# Patient Record
Sex: FEMALE | Race: WHITE | NOT HISPANIC OR LATINO | Employment: PART TIME | URBAN - METROPOLITAN AREA
[De-identification: names, ages, dates, MRNs, and addresses within clinical notes are randomized per-mention and may not be internally consistent; named-entity substitution may affect disease eponyms.]

---
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
## 2019-01-10 ENCOUNTER — OFFICE VISIT (OUTPATIENT)
Dept: INTERNAL MEDICINE CLINIC | Facility: CLINIC | Age: 19
End: 2019-01-10
Payer: COMMERCIAL

## 2019-01-10 VITALS
SYSTOLIC BLOOD PRESSURE: 102 MMHG | WEIGHT: 187.6 LBS | OXYGEN SATURATION: 97 % | BODY MASS INDEX: 34.31 KG/M2 | DIASTOLIC BLOOD PRESSURE: 78 MMHG | HEART RATE: 83 BPM | TEMPERATURE: 98.5 F

## 2019-01-10 DIAGNOSIS — H66.91 OTITIS OF RIGHT EAR: Primary | ICD-10-CM

## 2019-01-10 PROCEDURE — 99213 OFFICE O/P EST LOW 20 MIN: CPT | Performed by: NURSE PRACTITIONER

## 2019-01-10 RX ORDER — AMOXICILLIN AND CLAVULANATE POTASSIUM 875; 125 MG/1; MG/1
1 TABLET, FILM COATED ORAL EVERY 12 HOURS SCHEDULED
Qty: 7 TABLET | Refills: 0 | Status: SHIPPED | OUTPATIENT
Start: 2019-01-10 | End: 2019-01-17

## 2019-01-10 NOTE — LETTER
January 10, 2019     Patient: Song Muñoz   YOB: 2000   Date of Visit: 1/10/2019       To Whom it May Concern:    Song Muñoz is under my professional care  She was seen in my office on 1/10/2019  She may return to work on 1/11/19  If you have any questions or concerns, please don't hesitate to call           Sincerely,          KRISTEN Mackenzie        CC: No Recipients

## 2019-01-10 NOTE — PROGRESS NOTES
Assessment/Plan:    Otitis  Start on amox x 10 days by patient first  Minimal improvement  Continues with erythema to R ear  Start augmentin x 7 days  Additionally recommend flonase as mild fluid L ear    Pt aware needs labs and will schedule routine follow-up     Diagnoses and all orders for this visit:    Otitis of right ear  -     amoxicillin-clavulanate (AUGMENTIN) 875-125 mg per tablet; Take 1 tablet by mouth every 12 (twelve) hours for 7 days        Subjective:      Patient ID: Song Muñoz is a 22 y o  female  Earache    There is pain in both (primarily R ear) ears  This is a new problem  Episode onset: 2 weeks  The problem occurs constantly  The problem has been waxing and waning  There has been no fever  Associated symptoms include headaches  Pertinent negatives include no abdominal pain, coughing, diarrhea, ear discharge, hearing loss, rhinorrhea, sore throat or vomiting  Associated symptoms comments: + tinnitus   Treatments tried: amoxicillin  The treatment provided mild relief  There is no history of a chronic ear infection or hearing loss  The following portions of the patient's history were reviewed and updated as appropriate: allergies, current medications, past family history, past medical history, past social history, past surgical history and problem list     Review of Systems   Constitutional: Negative for chills, fatigue and fever  HENT: Positive for ear pain  Negative for ear discharge, hearing loss, postnasal drip, rhinorrhea, sinus pain, sinus pressure and sore throat  Respiratory: Negative for cough, shortness of breath and wheezing  Cardiovascular: Negative for chest pain and palpitations  Gastrointestinal: Positive for constipation  Negative for abdominal pain, diarrhea, nausea and vomiting  Genitourinary: Negative for dysuria, frequency and hematuria  Neurological: Positive for headaches  Negative for dizziness and light-headedness           Past Medical History: Diagnosis Date    ADHD     Anxiety     Asthma     exercise induced    Depression     EVELYNE (generalized anxiety disorder)     IBS (irritable bowel syndrome)     Migraine headache     Obesity     bmi 33 0-33 9         Current Outpatient Prescriptions:     Adapalene-Benzoyl Peroxide (EPIDUO) 0 1-2 5 % gel, Apply 1 application topically daily Apply to affected area externally, Disp: , Rfl:     albuterol (VENTOLIN HFA) 90 mcg/act inhaler, Inhale 2 puffs every 6 (six) hours as needed for wheezing (20-30 min before exercise as needed), Disp: , Rfl:     atoMOXetine (STRATTERA) 18 mg capsule, Take 18 mg by mouth daily, Disp: , Rfl:     Butalbital-APAP-Caffeine (FIORICET) -40 MG CAPS, Take 1 tablet by mouth every 6 (six) hours, Disp: , Rfl:     butalbital-aspirin-caffeine (FIORINAL) -40 MG per tablet, Take 1 tablet by mouth every 4 (four) hours as needed for migraine, Disp: , Rfl:     FLUoxetine (PROzac) 40 MG capsule, Take 40 mg by mouth daily in the early morning, Disp: , Rfl:     ibuprofen (MOTRIN) 600 mg tablet, Take 600 mg by mouth 3 (three) times a day as needed for mild pain, Disp: , Rfl:     levonorgestrel (KYLEENA) 19 5 MG intrauterine device, 1 Intra Uterine Device by Intrauterine route once, Disp: , Rfl:     amoxicillin-clavulanate (AUGMENTIN) 875-125 mg per tablet, Take 1 tablet by mouth every 12 (twelve) hours for 7 days, Disp: 7 tablet, Rfl: 0    Allergies   Allergen Reactions    Dust Mite Extract     Fruit & Vegetable Daily [Fish Oil]      blueberries    Grass Extracts [Gramineae Pollens]     Molds & Smuts        Social History   Past Surgical History:   Procedure Laterality Date    HAND SURGERY Left 12/2015    HEMANGIOMA EXCISION      IR SCLEROTHERAPY N/A 08/2015    WISDOM TOOTH EXTRACTION       Family History   Problem Relation Age of Onset    Depression Mother     Arthritis Mother     Myocarditis Father     Coronary artery disease Father        Objective:  /78 (BP Location: Left arm, Patient Position: Sitting, Cuff Size: Standard)   Pulse 83   Temp 98 5 °F (36 9 °C) (Oral)   Wt 85 1 kg (187 lb 9 6 oz) Comment: with shoes  SpO2 97%   BMI 34 31 kg/m²      Physical Exam   Constitutional: She is oriented to person, place, and time  She appears well-developed and well-nourished  No distress  HENT:   Head: Normocephalic and atraumatic  Right Ear: Hearing and ear canal normal  There is tenderness  No drainage or swelling  Tympanic membrane is erythematous  Tympanic membrane is not injected, not scarred, not perforated, not retracted and not bulging  Left Ear: Hearing, external ear and ear canal normal  Tympanic membrane is not injected, not scarred, not perforated, not erythematous, not retracted and not bulging  A middle ear effusion is present  Nose: Nose normal  No mucosal edema or rhinorrhea  Mouth/Throat: Uvula is midline, oropharynx is clear and moist and mucous membranes are normal  No oropharyngeal exudate  Cardiovascular: Normal rate and regular rhythm  Pulmonary/Chest: Effort normal and breath sounds normal  No respiratory distress  She has no wheezes  Lymphadenopathy:     She has no cervical adenopathy  Neurological: She is alert and oriented to person, place, and time  Skin: Skin is warm and dry  Psychiatric: She has a normal mood and affect  Her behavior is normal  Judgment and thought content normal    Nursing note and vitals reviewed

## 2019-01-10 NOTE — PATIENT INSTRUCTIONS
Stop amoxicillin  Start augmentin    Start flonase to help with fluid to L ear    Rest   Stay well hydrated

## 2019-02-25 ENCOUNTER — OFFICE VISIT (OUTPATIENT)
Dept: INTERNAL MEDICINE CLINIC | Facility: CLINIC | Age: 19
End: 2019-02-25
Payer: COMMERCIAL

## 2019-02-25 VITALS
HEART RATE: 83 BPM | DIASTOLIC BLOOD PRESSURE: 68 MMHG | BODY MASS INDEX: 34.16 KG/M2 | TEMPERATURE: 98.2 F | HEIGHT: 62 IN | WEIGHT: 185.6 LBS | SYSTOLIC BLOOD PRESSURE: 102 MMHG | OXYGEN SATURATION: 97 %

## 2019-02-25 DIAGNOSIS — R11.14 BILIOUS VOMITING WITH NAUSEA: ICD-10-CM

## 2019-02-25 DIAGNOSIS — A08.4 VIRAL GASTROENTERITIS: Primary | ICD-10-CM

## 2019-02-25 PROBLEM — R11.2 NAUSEA & VOMITING: Status: ACTIVE | Noted: 2019-02-25

## 2019-02-25 PROCEDURE — 99213 OFFICE O/P EST LOW 20 MIN: CPT | Performed by: NURSE PRACTITIONER

## 2019-02-25 RX ORDER — ONDANSETRON 4 MG/1
4 TABLET, FILM COATED ORAL EVERY 8 HOURS PRN
Qty: 20 TABLET | Refills: 0 | Status: SHIPPED | OUTPATIENT
Start: 2019-02-25 | End: 2019-03-08

## 2019-02-25 NOTE — PROGRESS NOTES
Assessment/Plan:    Viral Gastroenteritis  - abd exam with diffuse tenderness  - can hold on imaging or labs @ this time  - use zofran prn nausea  - no concerns for pregnancy:  IUD   - stay well hydrated  - clear liquid diet and slowly advance - avoid sugar and dairy as well  - If no improvement in your symptoms or your symptoms worsening contact our office  As discussed you need to go to the ED for severe/worsening abdominal pain, intractable nausea/vomiting, unable to keep fluids down  Patient will need refills of her chronic medications that was previously filled by her old PCP  This would include Prozac, Strattera  Patient anxiety, depression, ADHD has not been assessed recently and we have not prescribed these medications  Patient will make a follow-up in the next 2-3 weeks prior to her running out of her medications to discuss her conditions further and refill prescriptions  Diagnoses and all orders for this visit:    Viral gastroenteritis  -     ondansetron (ZOFRAN) 4 mg tablet; Take 1 tablet (4 mg total) by mouth every 8 (eight) hours as needed for nausea or vomiting    Bilious vomiting with nausea  -     ondansetron (ZOFRAN) 4 mg tablet; Take 1 tablet (4 mg total) by mouth every 8 (eight) hours as needed for nausea or vomiting        Subjective:      Patient ID: Kayla Chery is a 22 y o  female  Abdominal Pain   This is a new problem  Episode onset: 3 days  The onset quality is sudden  The problem occurs intermittently  The problem has been unchanged  The pain is located in the periumbilical region  The pain is at a severity of 7/10  The quality of the pain is aching and cramping  The abdominal pain does not radiate  Associated symptoms include anorexia, diarrhea, nausea and vomiting (improved slighlty)  Pertinent negatives include no arthralgias, constipation, dysuria, fever, frequency, headaches, hematuria or myalgias  The pain is aggravated by eating  Relieved by: IBU   The treatment provided no relief  Current birth control: IUD    The following portions of the patient's history were reviewed and updated as appropriate: allergies, current medications, past family history, past medical history, past social history, past surgical history and problem list     Review of Systems   Constitutional: Positive for appetite change (decreased)  Negative for chills, fatigue, fever and unexpected weight change  Respiratory: Negative for shortness of breath and wheezing  Cardiovascular: Negative for chest pain and palpitations  Gastrointestinal: Positive for abdominal pain, anorexia, diarrhea, nausea and vomiting (improved slighlty)  Negative for blood in stool and constipation  Genitourinary: Negative for dysuria, frequency and hematuria  Musculoskeletal: Negative for arthralgias and myalgias  Skin: Negative for rash  Neurological: Negative for dizziness, light-headedness and headaches           Past Medical History:   Diagnosis Date    ADHD     Anxiety     Asthma     exercise induced    Depression     EVELYNE (generalized anxiety disorder)     IBS (irritable bowel syndrome)     Migraine headache     Obesity     bmi 33 0-33 9         Current Outpatient Medications:     Adapalene-Benzoyl Peroxide (EPIDUO) 0 1-2 5 % gel, Apply 1 application topically daily Apply to affected area externally, Disp: , Rfl:     albuterol (VENTOLIN HFA) 90 mcg/act inhaler, Inhale 2 puffs every 6 (six) hours as needed for wheezing (20-30 min before exercise as needed), Disp: , Rfl:     atoMOXetine (STRATTERA) 18 mg capsule, Take 18 mg by mouth daily, Disp: , Rfl:     Butalbital-APAP-Caffeine (FIORICET) -40 MG CAPS, Take 1 tablet by mouth every 6 (six) hours as needed , Disp: , Rfl:     FLUoxetine (PROzac) 40 MG capsule, Take 40 mg by mouth daily in the early morning, Disp: , Rfl:     ibuprofen (MOTRIN) 600 mg tablet, Take 600 mg by mouth 3 (three) times a day as needed for mild pain, Disp: , Rfl:   levonorgestrel (KYLEENA) 19 5 MG intrauterine device, 1 Intra Uterine Device by Intrauterine route once, Disp: , Rfl:     butalbital-aspirin-caffeine (FIORINAL) -40 MG per tablet, Take 1 tablet by mouth every 4 (four) hours as needed for migraine, Disp: , Rfl:     ondansetron (ZOFRAN) 4 mg tablet, Take 1 tablet (4 mg total) by mouth every 8 (eight) hours as needed for nausea or vomiting, Disp: 20 tablet, Rfl: 0    Allergies   Allergen Reactions    Dust Mite Extract     Fruit & Vegetable Daily [Fish Oil]      blueberries    Grass Extracts [Gramineae Pollens]     Molds & Smuts        Social History   Past Surgical History:   Procedure Laterality Date    HAND SURGERY Left 12/2015    HEMANGIOMA EXCISION      IR SCLEROTHERAPY N/A 08/2015    WISDOM TOOTH EXTRACTION       Family History   Problem Relation Age of Onset    Depression Mother     Arthritis Mother     Myocarditis Father     Coronary artery disease Father        Objective:  /68 (BP Location: Left arm, Patient Position: Sitting, Cuff Size: Standard)   Pulse 83   Temp 98 2 °F (36 8 °C) (Oral)   Ht 5' 2" (1 575 m)   Wt 84 2 kg (185 lb 9 6 oz)   SpO2 97%   BMI 33 95 kg/m²      Physical Exam   Constitutional: She is oriented to person, place, and time  She appears well-developed and well-nourished  Non-toxic appearance  She does not have a sickly appearance  She does not appear ill  No distress  Cardiovascular: Normal rate and regular rhythm  Pulmonary/Chest: Effort normal and breath sounds normal  No respiratory distress  She has no wheezes  Abdominal: Soft  Bowel sounds are normal  She exhibits no distension and no mass  There is tenderness (diffuse mild tenderness)  There is no rebound, no guarding, no tenderness at McBurney's point and negative Jesus's sign  Neurological: She is alert and oriented to person, place, and time  Skin: Skin is warm and dry  Psychiatric: She has a normal mood and affect   Her behavior is normal  Judgment and thought content normal    Nursing note and vitals reviewed

## 2019-02-25 NOTE — PATIENT INSTRUCTIONS
Recommend starting a clear liquid diet and slowly advance foods as tolerated  Start with bland foods such as toast, rice, chicken broth  Stay well hydrated  Rest   If no improvement in your symptoms or your symptoms worsening contact our office  As discussed you need to go to the ED for severe/worsening abdominal pain, intractable nausea/vomiting, unable to keep fluids down  Avoid sugar foods    Avoid dairy    If you develop increased abd pain, fever, chills, go to ED for evaluation and imaging

## 2019-02-27 ENCOUNTER — HOSPITAL ENCOUNTER (EMERGENCY)
Facility: HOSPITAL | Age: 19
Discharge: HOME | End: 2019-02-27
Attending: EMERGENCY MEDICINE | Admitting: EMERGENCY MEDICINE
Payer: COMMERCIAL

## 2019-02-27 VITALS
TEMPERATURE: 98.2 F | OXYGEN SATURATION: 97 % | SYSTOLIC BLOOD PRESSURE: 130 MMHG | HEART RATE: 77 BPM | BODY MASS INDEX: 33.65 KG/M2 | DIASTOLIC BLOOD PRESSURE: 69 MMHG | WEIGHT: 184 LBS | RESPIRATION RATE: 18 BRPM

## 2019-02-27 DIAGNOSIS — R11.0 NAUSEA: ICD-10-CM

## 2019-02-27 DIAGNOSIS — R10.9 ABDOMINAL PAIN: Primary | ICD-10-CM

## 2019-02-27 LAB
ALBUMIN SERPL BCP-MCNC: 4.2 G/DL (ref 3.5–5)
ALP SERPL-CCNC: 82 U/L (ref 46–384)
ALT SERPL W P-5'-P-CCNC: 23 U/L (ref 12–78)
ANION GAP SERPL CALCULATED.3IONS-SCNC: 3 MMOL/L (ref 4–13)
AST SERPL W P-5'-P-CCNC: 12 U/L (ref 5–45)
BACTERIA UR QL AUTO: ABNORMAL /HPF
BASOPHILS # BLD AUTO: 0.05 THOUSANDS/ΜL (ref 0–0.1)
BASOPHILS NFR BLD AUTO: 1 % (ref 0–1)
BILIRUB SERPL-MCNC: 0.45 MG/DL (ref 0.2–1)
BILIRUB UR QL STRIP: NEGATIVE
BUN SERPL-MCNC: 9 MG/DL (ref 5–25)
CALCIUM SERPL-MCNC: 8.9 MG/DL (ref 8.3–10.1)
CHLORIDE SERPL-SCNC: 108 MMOL/L (ref 100–108)
CLARITY UR: CLEAR
CO2 SERPL-SCNC: 25 MMOL/L (ref 21–32)
COLOR UR: YELLOW
CREAT SERPL-MCNC: 0.88 MG/DL (ref 0.6–1.3)
EOSINOPHIL # BLD AUTO: 0.13 THOUSAND/ΜL (ref 0–0.61)
EOSINOPHIL NFR BLD AUTO: 2 % (ref 0–6)
ERYTHROCYTE [DISTWIDTH] IN BLOOD BY AUTOMATED COUNT: 11.8 % (ref 11.6–15.1)
EXT PREG TEST URINE: NORMAL
GFR SERPL CREATININE-BSD FRML MDRD: 96 ML/MIN/1.73SQ M
GLUCOSE SERPL-MCNC: 80 MG/DL (ref 65–140)
GLUCOSE UR STRIP-MCNC: NEGATIVE MG/DL
HCT VFR BLD AUTO: 44.7 % (ref 34.8–46.1)
HGB BLD-MCNC: 14.8 G/DL (ref 11.5–15.4)
HGB UR QL STRIP.AUTO: ABNORMAL
IMM GRANULOCYTES # BLD AUTO: 0.03 THOUSAND/UL (ref 0–0.2)
IMM GRANULOCYTES NFR BLD AUTO: 0 % (ref 0–2)
KETONES UR STRIP-MCNC: NEGATIVE MG/DL
LEUKOCYTE ESTERASE UR QL STRIP: ABNORMAL
LIPASE SERPL-CCNC: 70 U/L (ref 73–393)
LYMPHOCYTES # BLD AUTO: 1.74 THOUSANDS/ΜL (ref 0.6–4.47)
LYMPHOCYTES NFR BLD AUTO: 21 % (ref 14–44)
MCH RBC QN AUTO: 30.3 PG (ref 26.8–34.3)
MCHC RBC AUTO-ENTMCNC: 33.1 G/DL (ref 31.4–37.4)
MCV RBC AUTO: 91 FL (ref 82–98)
MONOCYTES # BLD AUTO: 0.53 THOUSAND/ΜL (ref 0.17–1.22)
MONOCYTES NFR BLD AUTO: 6 % (ref 4–12)
NEUTROPHILS # BLD AUTO: 5.8 THOUSANDS/ΜL (ref 1.85–7.62)
NEUTS SEG NFR BLD AUTO: 70 % (ref 43–75)
NITRITE UR QL STRIP: NEGATIVE
NON-SQ EPI CELLS URNS QL MICRO: ABNORMAL /HPF
NRBC BLD AUTO-RTO: 0 /100 WBCS
PH UR STRIP.AUTO: 6.5 [PH] (ref 4.5–8)
PLATELET # BLD AUTO: 276 THOUSANDS/UL (ref 149–390)
PMV BLD AUTO: 10.5 FL (ref 8.9–12.7)
POTASSIUM SERPL-SCNC: 4 MMOL/L (ref 3.5–5.3)
PROT SERPL-MCNC: 7.6 G/DL (ref 6.4–8.2)
PROT UR STRIP-MCNC: NEGATIVE MG/DL
RBC # BLD AUTO: 4.89 MILLION/UL (ref 3.81–5.12)
RBC #/AREA URNS AUTO: ABNORMAL /HPF
SODIUM SERPL-SCNC: 136 MMOL/L (ref 136–145)
SP GR UR STRIP.AUTO: 1.01 (ref 1–1.03)
UROBILINOGEN UR QL STRIP.AUTO: 0.2 E.U./DL
WBC # BLD AUTO: 8.28 THOUSAND/UL (ref 4.31–10.16)
WBC #/AREA URNS AUTO: ABNORMAL /HPF

## 2019-02-27 PROCEDURE — 80053 COMPREHEN METABOLIC PANEL: CPT | Performed by: EMERGENCY MEDICINE

## 2019-02-27 PROCEDURE — 81003 URINALYSIS AUTO W/O SCOPE: CPT

## 2019-02-27 PROCEDURE — 87086 URINE CULTURE/COLONY COUNT: CPT

## 2019-02-27 PROCEDURE — 85025 COMPLETE CBC W/AUTO DIFF WBC: CPT | Performed by: EMERGENCY MEDICINE

## 2019-02-27 PROCEDURE — 99284 EMERGENCY DEPT VISIT MOD MDM: CPT

## 2019-02-27 PROCEDURE — 36415 COLL VENOUS BLD VENIPUNCTURE: CPT

## 2019-02-27 PROCEDURE — 81025 URINE PREGNANCY TEST: CPT | Performed by: EMERGENCY MEDICINE

## 2019-02-27 PROCEDURE — 83690 ASSAY OF LIPASE: CPT | Performed by: EMERGENCY MEDICINE

## 2019-02-27 PROCEDURE — 81001 URINALYSIS AUTO W/SCOPE: CPT

## 2019-02-27 RX ORDER — DICYCLOMINE HCL 20 MG
20 TABLET ORAL ONCE
Status: COMPLETED | OUTPATIENT
Start: 2019-02-27 | End: 2019-02-27

## 2019-02-27 RX ORDER — METOCLOPRAMIDE 10 MG/1
10 TABLET ORAL 3 TIMES DAILY PRN
Qty: 10 TABLET | Refills: 0 | Status: SHIPPED | OUTPATIENT
Start: 2019-02-27 | End: 2019-03-08

## 2019-02-27 RX ORDER — DICYCLOMINE HCL 20 MG
20 TABLET ORAL 2 TIMES DAILY PRN
Qty: 10 TABLET | Refills: 0 | Status: SHIPPED | OUTPATIENT
Start: 2019-02-27 | End: 2019-03-08

## 2019-02-27 RX ORDER — METOCLOPRAMIDE 10 MG/1
10 TABLET ORAL ONCE
Status: COMPLETED | OUTPATIENT
Start: 2019-02-27 | End: 2019-02-27

## 2019-02-27 RX ADMIN — DICYCLOMINE HYDROCHLORIDE 20 MG: 20 TABLET ORAL at 16:55

## 2019-02-27 RX ADMIN — METOCLOPRAMIDE HYDROCHLORIDE 10 MG: 10 TABLET ORAL at 15:50

## 2019-02-27 NOTE — ED ATTENDING ATTESTATION
Sam Cota MD, saw and evaluated the patient  I have discussed the patient with the resident/non-physician practitioner and agree with the resident's/non-physician practitioner's findings, Plan of Care, and MDM as documented in the resident's/non-physician practitioner's note, except where noted  All available labs and Radiology studies were reviewed  I was present for key portions of any procedure(s) performed by the resident/non-physician practitioner and I was immediately available to provide assistance  At this point I agree with the current assessment done in the Emergency Department  I have conducted an independent evaluation of this patient a history and physical is as follows:      Critical Care Time  Procedures     21 yo female with abdominal pain intermittent epigastric cramping since Saturday  Pt with n/v/d which are now resolved  Pt seen by pcp on Monday and told it was probable food poisoning  No uri symtpoms, no urinary symptoms, no vaginal discharge or bleeding  Vss, afebrile, lungs cta, rrr, abdomen soft nontender, moist mucous membranes, labs, urine preg, urine dip  Symptom control

## 2019-02-27 NOTE — ED PROVIDER NOTES
History  Chief Complaint   Patient presents with    Abdominal Pain     Patient complains of abdominal pain with nausea and vomiting x 3 days  Was seen at her PCP a few days ago and was told to follow up if it continues  Patient also states that she feels lightheaded  22year-old woman presents for evaluation of abdominal pain  She reports onset of epigastric abdominal cramping, nausea, vomiting, and diarrhea starting Saturday morning  No fevers, URI symptoms, urinary symptoms, or vaginal discharge  No previous abdominal surgeries  She was evaluated by her PCP on Monday and was discharged with Zofran ODT which she has been taking with some relief  She was told her symptoms were likely due to food poisoning vs gastroenteritis  On arrival, she is afebrile with otherwise normal vital signs  Physical exam shows a well-appearing patient without signs of dehydration and a benign abdominal exam  Will perform abdominal labs and urine dip/pregnancy  Will administer Bentyl, Reglan, and reassess  Prior to Admission Medications   Prescriptions Last Dose Informant Patient Reported? Taking?    Adapalene-Benzoyl Peroxide (EPIDUO) 0 1-2 5 % gel   Yes No   Sig: Apply 1 application topically daily Apply to affected area externally   Butalbital-APAP-Caffeine (FIORICET) -40 MG CAPS Past Week at Unknown time  Yes Yes   Sig: Take 1 tablet by mouth every 6 (six) hours as needed    FLUoxetine (PROzac) 40 MG capsule 2/27/2019 at Unknown time  Yes Yes   Sig: Take 40 mg by mouth daily in the early morning   albuterol (VENTOLIN HFA) 90 mcg/act inhaler Past Month at Unknown time  Yes Yes   Sig: Inhale 2 puffs every 6 (six) hours as needed for wheezing (20-30 min before exercise as needed)   atoMOXetine (STRATTERA) 18 mg capsule 2/27/2019 at Unknown time  Yes Yes   Sig: Take 18 mg by mouth daily   butalbital-aspirin-caffeine (FIORINAL) -40 MG per tablet Past Week at Unknown time  Yes Yes   Sig: Take 1 tablet by mouth every 4 (four) hours as needed for migraine   ibuprofen (MOTRIN) 600 mg tablet Past Week at Unknown time  Yes Yes   Sig: Take 600 mg by mouth 3 (three) times a day as needed for mild pain   levonorgestrel (KYLEENA) 19 5 MG intrauterine device 2019 at Unknown time  Yes Yes   Si Intra Uterine Device by Intrauterine route once   ondansetron (ZOFRAN) 4 mg tablet 2019 at Unknown time  No Yes   Sig: Take 1 tablet (4 mg total) by mouth every 8 (eight) hours as needed for nausea or vomiting      Facility-Administered Medications: None       Past Medical History:   Diagnosis Date    ADHD     Anxiety     Asthma     exercise induced    Depression     EVELYNE (generalized anxiety disorder)     IBS (irritable bowel syndrome)     Migraine headache     Obesity     bmi 33 0-33 9       Past Surgical History:   Procedure Laterality Date    HAND SURGERY Left 2015    HEMANGIOMA EXCISION      IR SCLEROTHERAPY N/A 2015    WISDOM TOOTH EXTRACTION         Family History   Problem Relation Age of Onset    Depression Mother     Arthritis Mother     Myocarditis Father     Coronary artery disease Father      I have reviewed and agree with the history as documented  Social History     Tobacco Use    Smoking status: Current Every Day Smoker     Types: E-Cigarettes    Smokeless tobacco: Never Used    Tobacco comment: Jueel   Substance Use Topics    Alcohol use: No    Drug use: No        Review of Systems   Constitutional: Negative for appetite change, chills and fever  HENT: Negative for rhinorrhea and sore throat  Eyes: Negative for photophobia and visual disturbance  Respiratory: Negative for cough and shortness of breath  Cardiovascular: Negative for chest pain and leg swelling  Gastrointestinal: Positive for abdominal pain and nausea  Negative for blood in stool, constipation, diarrhea and vomiting  Genitourinary: Negative for dysuria, frequency, hematuria and vaginal discharge  Musculoskeletal: Negative for back pain and neck pain  Skin: Negative for rash and wound  Neurological: Negative for light-headedness and headaches  Physical Exam  ED Triage Vitals [02/27/19 1411]   Temperature Pulse Respirations Blood Pressure SpO2   98 2 °F (36 8 °C) 80 18 137/86 98 %      Temp Source Heart Rate Source Patient Position - Orthostatic VS BP Location FiO2 (%)   Oral Monitor Sitting Right arm --      Pain Score       6           Orthostatic Vital Signs  Vitals:    02/27/19 1411 02/27/19 1737   BP: 137/86 130/69   Pulse: 80 77   Patient Position - Orthostatic VS: Sitting Sitting       Physical Exam   Constitutional: She is oriented to person, place, and time  She appears well-developed and well-nourished  No distress  HENT:   Head: Normocephalic and atraumatic  Eyes: Pupils are equal, round, and reactive to light  Conjunctivae are normal  No scleral icterus  Neck: Neck supple  No JVD present  Cardiovascular: Normal rate, regular rhythm and normal heart sounds  Exam reveals no gallop and no friction rub  No murmur heard  Pulmonary/Chest: Effort normal and breath sounds normal  No respiratory distress  She has no wheezes  She has no rales  Abdominal: Soft  She exhibits no distension  There is no tenderness  There is no rigidity, no rebound, no guarding, no CVA tenderness and negative Jesus's sign  Musculoskeletal: She exhibits no edema or tenderness  Neurological: She is alert and oriented to person, place, and time  Skin: Skin is warm and dry  She is not diaphoretic  Psychiatric: She has a normal mood and affect  Her behavior is normal  Thought content normal    Vitals reviewed        ED Medications  Medications   metoclopramide (REGLAN) tablet 10 mg (10 mg Oral Given 2/27/19 1550)   dicyclomine (BENTYL) tablet 20 mg (20 mg Oral Given 2/27/19 1655)       Diagnostic Studies  Results Reviewed     Procedure Component Value Units Date/Time    Urine Microscopic [[id number]] (Abnormal) Collected:  02/27/19 1642    Lab Status:  Final result Specimen:  Urine, Clean Catch Updated:  02/27/19 1731     RBC, UA None Seen /hpf      WBC, UA 10-20 /hpf      Epithelial Cells Occasional /hpf      Bacteria, UA Occasional /hpf     Urine culture [[id number]] Collected:  02/27/19 1642    Lab Status: In process Specimen:  Urine, Clean Catch Updated:  02/27/19 1731    POCT pregnancy, urine [[id number]]  (Normal) Resulted:  02/27/19 1654    Lab Status:  Final result Updated:  02/27/19 1654     EXT PREG TEST UR (Ref: Negative) neg    ED Urine Macroscopic [[id number]]  (Abnormal) Collected:  02/27/19 1642    Lab Status:  Final result Specimen:  Urine Updated:  02/27/19 1638     Color, UA Yellow     Clarity, UA Clear     pH, UA 6 5     Leukocytes, UA Small     Nitrite, UA Negative     Protein, UA Negative mg/dl      Glucose, UA Negative mg/dl      Ketones, UA Negative mg/dl      Urobilinogen, UA 0 2 E U /dl      Bilirubin, UA Negative     Blood, UA Trace     Specific [place], UA 1 010    Narrative:       CLINITEK RESULT    POCT urinalysis dipstick [[id number]]     Lab Status:  No result Specimen:  Urine     Comprehensive metabolic panel [[id number]]  (Abnormal) Collected:  02/27/19 1421    Lab Status:  Final result Specimen:  Blood from Arm, Left Updated:  02/27/19 1448     Sodium 136 mmol/L      Potassium 4 0 mmol/L      Chloride 108 mmol/L      CO2 25 mmol/L      ANION GAP 3 mmol/L      BUN 9 mg/dL      Creatinine 0 88 mg/dL      Glucose 80 mg/dL      Calcium 8 9 mg/dL      AST 12 U/L      ALT 23 U/L      Alkaline Phosphatase 82 U/L      Total Protein 7 6 g/dL      Albumin 4 2 g/dL      Total Bilirubin 0 45 mg/dL      eGFR 96 ml/min/1 73sq m     Narrative:       National Kidney Disease Education Program recommendations are as follows:  GFR calculation is accurate only with a steady state creatinine  Chronic Kidney disease less than 60 ml/min/1 73 sq  meters  Kidney failure less than 15 ml/min/1 73 sq  meters  Lipase [[id number]]  (Abnormal) Collected:  02/27/19 1421    Lab Status:  Final result Specimen:  Blood from Arm, Left Updated:  02/27/19 1448     Lipase 70 u/L     CBC and differential [[id number]] Collected:  02/27/19 1421    Lab Status:  Final result Specimen:  Blood from Arm, Left Updated:  02/27/19 1430     WBC 8 28 Thousand/uL      RBC 4 89 Million/uL      Hemoglobin 14 8 g/dL      Hematocrit 44 7 %      MCV 91 fL      MCH 30 3 pg      MCHC 33 1 g/dL      RDW 11 8 %      MPV 10 5 fL      Platelets 504 Thousands/uL      nRBC 0 /100 WBCs      Neutrophils Relative 70 %      Immat GRANS % 0 %      Lymphocytes Relative 21 %      Monocytes Relative 6 %      Eosinophils Relative 2 %      Basophils Relative 1 %      Neutrophils Absolute 5 80 Thousands/µL      Immature Grans Absolute 0 03 Thousand/uL      Lymphocytes Absolute 1 74 Thousands/µL      Monocytes Absolute 0 53 Thousand/µL      Eosinophils Absolute 0 13 Thousand/µL      Basophils Absolute 0 05 Thousands/µL                  No orders to display         Procedures  Procedures      Phone Consults  ED Phone Contact    ED Course                               MDM  Number of Diagnoses or Management Options  Abdominal pain:   Nausea:   Diagnosis management comments: No episodes of vomiting while in the ED  Patient well-appearing with normal vital signs and a benign abdominal exam  Abdominal labs unremarkable  Urine dip shows occasional bacteria with occasional epithelial cells  Unlikely UTI in the setting of no urinary symptoms  She reports significant symptomatic relief after Bentyl and Reglan  She was counseled regarding symptomatic management, need for continued outpatient management, and was given strict return precautions for worsening symptoms        Disposition  Final diagnoses:   Abdominal pain   Nausea     Time reflects when diagnosis was documented in both MDM as applicable and the Disposition within this note     Time User Action Codes Description Comment 2/27/2019  5:48 PM Milena De Jesus Add [R10 9] Abdominal pain     2/27/2019  5:48 PM Milena De Jesus Add [R11 0] Nausea       ED Disposition     ED Disposition Condition Date/Time Comment    Discharge Stable Wed Feb 27, 2019  5:48 PM Junior Escoto discharge to home/self care              Follow-up Information     Follow up With Specialties Details Why Contact Info Additional Information    June Altamirano MD Internal Medicine Schedule an appointment as soon as possible for a visit in 3 days For re-evaluation and further treatment if your symptoms persist [address] [facility] [address]       [address] [place] [address] Emergency Department Emergency Medicine  As needed, for worsening abdominal pain [address] [facility] ED, [address], [address]          Discharge Medication List as of 2/27/2019  5:54 PM      START taking these medications    Details   dicyclomine (BENTYL) 20 mg tablet Take 1 tablet (20 mg total) by mouth 2 (two) times a day as needed (Abdominal pain), Starting Wed 2/27/2019, Print      metoclopramide (REGLAN) 10 mg tablet Take 1 tablet (10 mg total) by mouth 3 (three) times a day as needed (Nausea), Starting Wed 2/27/2019, Print         CONTINUE these medications which have NOT CHANGED    Details   albuterol (VENTOLIN HFA) 90 mcg/act inhaler Inhale 2 puffs every 6 (six) hours as needed for wheezing (20-30 min before exercise as needed), Historical Med      atoMOXetine (STRATTERA) 18 mg capsule Take 18 mg by mouth daily, Historical Med      Butalbital-APAP-Caffeine (FIORICET) -40 MG CAPS Take 1 tablet by mouth every 6 (six) hours as needed , Historical Med      butalbital-aspirin-caffeine (FIORINAL) -40 MG per tablet Take 1 tablet by mouth every 4 (four) hours as needed for migraine, Historical Med      FLUoxetine (PROzac) 40 MG capsule Take 40 mg by mouth daily in the early morning, Historical Med      ibuprofen (MOTRIN) 600 mg tablet Take 600 mg by mouth 3 (three) times a day as needed for mild pain, Historical Med      levonorgestrel (KYLEENA) 19 5 MG intrauterine device 1 Intra Uterine Device by Intrauterine route once, Historical Med      ondansetron (ZOFRAN) 4 mg tablet Take 1 tablet (4 mg total) by mouth every 8 (eight) hours as needed for nausea or vomiting, Starting Mon 2/25/2019, Normal      Adapalene-Benzoyl Peroxide (EPIDUO) 0 1-2 5 % gel Apply 1 application topically daily Apply to affected area externally, Historical Med           No discharge procedures on file  ED Provider  Attending physically available and evaluated [facility]  I managed the patient along with the ED Attending      Electronically Signed by         Kia Gayle MD  02/27/19 6537

## 2019-03-01 LAB — BACTERIA UR CULT: NORMAL

## 2019-03-08 ENCOUNTER — OFFICE VISIT (OUTPATIENT)
Dept: INTERNAL MEDICINE CLINIC | Facility: CLINIC | Age: 19
End: 2019-03-08
Payer: COMMERCIAL

## 2019-03-08 VITALS
BODY MASS INDEX: 33.9 KG/M2 | TEMPERATURE: 100.4 F | HEART RATE: 111 BPM | WEIGHT: 184.2 LBS | SYSTOLIC BLOOD PRESSURE: 102 MMHG | OXYGEN SATURATION: 98 % | HEIGHT: 62 IN | DIASTOLIC BLOOD PRESSURE: 70 MMHG

## 2019-03-08 DIAGNOSIS — R68.89 FLU-LIKE SYMPTOMS: Primary | ICD-10-CM

## 2019-03-08 PROBLEM — R11.2 NAUSEA & VOMITING: Status: RESOLVED | Noted: 2019-02-25 | Resolved: 2019-03-08

## 2019-03-08 PROBLEM — A08.4 VIRAL GASTROENTERITIS: Status: RESOLVED | Noted: 2019-02-25 | Resolved: 2019-03-08

## 2019-03-08 PROBLEM — H66.91 OTITIS OF RIGHT EAR: Status: RESOLVED | Noted: 2019-01-10 | Resolved: 2019-03-08

## 2019-03-08 PROCEDURE — 3008F BODY MASS INDEX DOCD: CPT | Performed by: NURSE PRACTITIONER

## 2019-03-08 PROCEDURE — 99213 OFFICE O/P EST LOW 20 MIN: CPT | Performed by: NURSE PRACTITIONER

## 2019-03-08 RX ORDER — OSELTAMIVIR PHOSPHATE 75 MG/1
75 CAPSULE ORAL 2 TIMES DAILY
Qty: 10 CAPSULE | Refills: 0 | Status: SHIPPED | OUTPATIENT
Start: 2019-03-08 | End: 2019-03-13

## 2019-03-08 NOTE — LETTER
March 8, 2019     Patient: Arthur Butler   YOB: 2000   Date of Visit: 3/8/2019       To Whom it May Concern:    Arthur Butler is under my professional care  She was seen in my office on 3/8/2019  She may return to work on 3/11  If you have any questions or concerns, please don't hesitate to call           Sincerely,          KRISTEN Guerrero        CC: No Recipients

## 2019-03-08 NOTE — PROGRESS NOTES
Assessment/Plan:    Flu-like Symptoms  -patient presented with flu-like symptoms x2 days  Given that it is currently Friday and send out influenza testing would not be back until Monday discussed with patient empirically treating based on symptoms as well as the noted tachycardia and fever on exam today  Discussed with patient side effects of medications she wishes to proceed forward with starting Tamiflu   -no bacterial infection noted on exam    Patient does have routine follow-up with myself on Monday for chronic conditions and needing refills on her psychiatric medications for anxiety and depression  She will be rescheduled his appointment for one week later given her current illness  Diagnoses and all orders for this visit:    Flu-like symptoms  -     oseltamivir (TAMIFLU) 75 mg capsule; Take 1 capsule (75 mg total) by mouth 2 (two) times a day for 5 days        Subjective:      Patient ID: Jorge Stapleton is a 22 y o  female  URI    This is a new problem  Episode onset: 2 days  The problem has been unchanged  The maximum temperature recorded prior to her arrival was 101 - 101 9 F  Associated symptoms include congestion, coughing, headaches, joint pain, sinus pain and a sore throat  Pertinent negatives include no abdominal pain, chest pain, diarrhea, ear pain, nausea, plugged ear sensation, vomiting or wheezing  Treatments tried: nyquil  The treatment provided no relief  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  Unknown exposure    The following portions of the patient's history were reviewed and updated as appropriate: allergies, current medications, past family history, past medical history, past social history, past surgical history and problem list     Review of Systems   Constitutional: Positive for chills, fatigue and fever  HENT: Positive for congestion, postnasal drip, sinus pressure, sinus pain and sore throat  Negative for ear pain  Respiratory: Positive for cough   Negative for shortness of breath and wheezing  Cardiovascular: Negative for chest pain and palpitations  Gastrointestinal: Negative for abdominal pain, constipation, diarrhea, nausea and vomiting  Musculoskeletal: Positive for arthralgias, joint pain and myalgias  Neurological: Positive for headaches  Negative for dizziness and light-headedness           Past Medical History:   Diagnosis Date    ADHD     Anxiety     Asthma     exercise induced    Depression     EVELYNE (generalized anxiety disorder)     IBS (irritable bowel syndrome)     Migraine headache     Obesity     bmi 33 0-33 9         Current Outpatient Medications:     Adapalene-Benzoyl Peroxide (EPIDUO) 0 1-2 5 % gel, Apply 1 application topically daily Apply to affected area externally, Disp: , Rfl:     albuterol (VENTOLIN HFA) 90 mcg/act inhaler, Inhale 2 puffs every 6 (six) hours as needed for wheezing (20-30 min before exercise as needed), Disp: , Rfl:     atoMOXetine (STRATTERA) 18 mg capsule, Take 18 mg by mouth daily, Disp: , Rfl:     Butalbital-APAP-Caffeine (FIORICET) -40 MG CAPS, Take 1 tablet by mouth every 6 (six) hours as needed , Disp: , Rfl:     FLUoxetine (PROzac) 40 MG capsule, Take 40 mg by mouth daily in the early morning, Disp: , Rfl:     levonorgestrel (KYLEENA) 19 5 MG intrauterine device, 1 Intra Uterine Device by Intrauterine route once, Disp: , Rfl:     oseltamivir (TAMIFLU) 75 mg capsule, Take 1 capsule (75 mg total) by mouth 2 (two) times a day for 5 days, Disp: 10 capsule, Rfl: 0    Allergies   Allergen Reactions    Dust Mite Extract     Fruit & Vegetable Daily [Fish Oil]      blueberries    Grass Extracts [Gramineae Pollens]     Molds & Smuts        Social History   Past Surgical History:   Procedure Laterality Date    HAND SURGERY Left 12/2015    HEMANGIOMA EXCISION      IR SCLEROTHERAPY N/A 08/2015    WISDOM TOOTH EXTRACTION       Family History   Problem Relation Age of Onset    Depression Mother     Arthritis Mother     Myocarditis Father     Coronary artery disease Father        Objective:  /70 (BP Location: Left arm, Patient Position: Sitting, Cuff Size: Standard)   Pulse (!) 111   Temp 100 4 °F (38 °C) (Oral)   Ht 5' 2" (1 575 m)   Wt 83 6 kg (184 lb 3 2 oz)   SpO2 98%   BMI 33 69 kg/m²      Physical Exam   Constitutional: She is oriented to person, place, and time  She appears well-developed and well-nourished  Non-toxic appearance  She has a sickly appearance  She appears ill  No distress  HENT:   Head: Normocephalic and atraumatic  Right Ear: Hearing, tympanic membrane, external ear and ear canal normal    Left Ear: Hearing, tympanic membrane, external ear and ear canal normal    Nose: Mucosal edema and rhinorrhea present  Right sinus exhibits no maxillary sinus tenderness and no frontal sinus tenderness  Left sinus exhibits no maxillary sinus tenderness and no frontal sinus tenderness  Mouth/Throat: Uvula is midline, oropharynx is clear and moist and mucous membranes are normal    Mild post nasal gtt   Cardiovascular: Normal rate and regular rhythm  Pulmonary/Chest: Effort normal and breath sounds normal  No respiratory distress  She has no wheezes  Lymphadenopathy:     She has no cervical adenopathy  Neurological: She is alert and oriented to person, place, and time  Skin: Skin is warm and dry  Flushed   Psychiatric: She has a normal mood and affect  Her behavior is normal  Judgment and thought content normal    Nursing note and vitals reviewed

## 2020-04-01 ENCOUNTER — TELEPHONE (OUTPATIENT)
Dept: INTERNAL MEDICINE CLINIC | Facility: CLINIC | Age: 20
End: 2020-04-01

## 2020-07-21 ENCOUNTER — TELEPHONE (OUTPATIENT)
Dept: PSYCHIATRY | Facility: CLINIC | Age: 20
End: 2020-07-21

## 2020-07-21 NOTE — TELEPHONE ENCOUNTER
Jeancarlos Powell when you have time this week can you contact daryl to schedule  I can only accept patients 24 and older with commercial insurance  Thank You!

## 2020-07-28 ENCOUNTER — OFFICE VISIT (OUTPATIENT)
Dept: URGENT CARE | Age: 20
End: 2020-07-28
Payer: COMMERCIAL

## 2020-07-28 VITALS
OXYGEN SATURATION: 99 % | DIASTOLIC BLOOD PRESSURE: 89 MMHG | SYSTOLIC BLOOD PRESSURE: 135 MMHG | HEART RATE: 73 BPM | RESPIRATION RATE: 18 BRPM | TEMPERATURE: 98 F
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
## 2020-07-28 DIAGNOSIS — S16.1XXA STRAIN OF NECK MUSCLE, INITIAL ENCOUNTER: Primary | ICD-10-CM

## 2020-07-28 PROCEDURE — G0382 LEV 3 HOSP TYPE B ED VISIT: HCPCS | Performed by: NURSE PRACTITIONER

## 2020-07-28 RX ORDER — BACLOFEN 10 MG/1
10 TABLET ORAL 3 TIMES DAILY PRN
Qty: 30 TABLET | Refills: 0 | Status: SHIPPED | OUTPATIENT
Start: 2020-07-28

## 2020-07-28 RX ORDER — NAPROXEN 500 MG/1
500 TABLET ORAL 2 TIMES DAILY WITH MEALS
Qty: 30 TABLET | Refills: 0 | Status: SHIPPED | OUTPATIENT
Start: 2020-07-28
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
## 2020-07-29 ENCOUNTER — OFFICE VISIT (OUTPATIENT)
Dept: INTERNAL MEDICINE CLINIC | Facility: CLINIC | Age: 20
End: 2020-07-29
Payer: COMMERCIAL

## 2020-07-29 ENCOUNTER — TELEPHONE (OUTPATIENT)
Dept: PSYCHIATRY | Facility: CLINIC | Age: 20
End: 2020-07-29

## 2020-07-29 VITALS
DIASTOLIC BLOOD PRESSURE: 68 MMHG | OXYGEN SATURATION: 97 % | HEART RATE: 84 BPM | WEIGHT: 150.2 LBS | TEMPERATURE: 98.2 F | SYSTOLIC BLOOD PRESSURE: 120 MMHG | BODY MASS INDEX: 27.64 KG/M2 | HEIGHT: 62 IN

## 2020-07-29 DIAGNOSIS — M25.572 ACUTE LEFT ANKLE PAIN: Primary | ICD-10-CM
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
## 2020-07-29 PROCEDURE — 99213 OFFICE O/P EST LOW 20 MIN: CPT | Performed by: NURSE PRACTITIONER

## 2020-07-29 PROCEDURE — 3008F BODY MASS INDEX DOCD: CPT | Performed by: NURSE PRACTITIONER

## 2020-07-29 NOTE — PATIENT INSTRUCTIONS
naproxen from pharm - if problems call office and we can send to different pharmacy  Continue baclofen    Ice  Rest    Will check xray of L ankle

## 2020-07-29 NOTE — TELEPHONE ENCOUNTER
I called Lexine Goltz and left a message that we are looking to do an Intake and schedule her for a NP appointment  Will call again at a later date

## 2020-07-30 ENCOUNTER — APPOINTMENT (OUTPATIENT)
Dept: RADIOLOGY | Age: 20
End: 2020-07-30
Payer: COMMERCIAL
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
## 2020-07-30 DIAGNOSIS — M25.572 ACUTE LEFT ANKLE PAIN: ICD-10-CM

## 2020-07-30 PROCEDURE — 73610 X-RAY EXAM OF ANKLE: CPT

## 2020-07-31 ENCOUNTER — TELEPHONE (OUTPATIENT)
Dept: INTERNAL MEDICINE CLINIC | Age: 20
End: 2020-07-31

## 2020-07-31 NOTE — TELEPHONE ENCOUNTER
----- Message from [address] [place] [address] sent at 7/31/2020  2:43 PM EDT -----  Please let pt know xray ankle normal  Continue treatment plan we discussed

## 2020-08-03 ENCOUNTER — OFFICE VISIT (OUTPATIENT)
Dept: INTERNAL MEDICINE CLINIC | Facility: CLINIC | Age: 20
End: 2020-08-03
Payer: COMMERCIAL

## 2020-08-03 VITALS
WEIGHT: 148 LBS | HEART RATE: 79 BPM | BODY MASS INDEX: 27.23 KG/M2 | HEIGHT: 62 IN | SYSTOLIC BLOOD PRESSURE: 92 MMHG | TEMPERATURE: 97.9 F | DIASTOLIC BLOOD PRESSURE: 50 MMHG | OXYGEN SATURATION: 98 %

## 2020-08-03 DIAGNOSIS — F90.9 ATTENTION DEFICIT HYPERACTIVITY DISORDER (ADHD), UNSPECIFIED ADHD TYPE: ICD-10-CM

## 2020-08-03 DIAGNOSIS — F41.9 ANXIETY: ICD-10-CM

## 2020-08-03 DIAGNOSIS — F33.1 MODERATE EPISODE OF RECURRENT MAJOR DEPRESSIVE DISORDER (HCC): Primary | ICD-10-CM

## 2020-08-03 DIAGNOSIS — Z13.220 LIPID SCREENING: ICD-10-CM

## 2020-08-03 PROBLEM — Z13.29 THYROID DISORDER SCREENING: Status: RESOLVED | Noted: 2018-10-23 | Resolved: 2020-08-03

## 2020-08-03 PROBLEM — R68.89 FLU-LIKE SYMPTOMS: Status: RESOLVED | Noted: 2019-03-08 | Resolved: 2020-08-03

## 2020-08-03 PROBLEM — Z76.89 ENCOUNTER TO ESTABLISH CARE: Status: RESOLVED | Noted: 2018-10-23 | Resolved: 2020-08-03

## 2020-08-03 PROCEDURE — 1036F TOBACCO NON-USER: CPT | Performed by: NURSE PRACTITIONER

## 2020-08-03 PROCEDURE — 99213 OFFICE O/P EST LOW 20 MIN: CPT | Performed by: NURSE PRACTITIONER

## 2020-08-03 PROCEDURE — 3008F BODY MASS INDEX DOCD: CPT | Performed by: NURSE PRACTITIONER

## 2020-08-03 RX ORDER — VENLAFAXINE HYDROCHLORIDE 37.5 MG/1
37.5 CAPSULE, EXTENDED RELEASE ORAL
Qty: 30 CAPSULE | Refills: 1 | Status: SHIPPED | OUTPATIENT
Start: 2020-08-03

## 2020-08-03 NOTE — ASSESSMENT & PLAN NOTE
Uncontrolled  Patient was previously on Prozac which did not control her symptoms  Will start her on Effexor 37 5 mg daily    Referral to psychiatry as well
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
## 2020-08-04 ENCOUNTER — TELEPHONE (OUTPATIENT)
Dept: PSYCHIATRY | Facility: CLINIC | Age: 20
End: 2020-08-04

## 2020-08-04 NOTE — TELEPHONE ENCOUNTER
Behavorial Health Outpatient Intake Questions    Referred by:    Please advised interviewee that they need to answer all questions truthfully to allow for best care and any misrepresentations of information may affect their ability to be seen at this clinic   => Was this discussed? Yes     Behavorial Health Outpatient Intake History -     Presenting Problem (in patient's words):    Depression, Anxiety and Anger  Was Suicidal in the past      Are there any developmental disabilities? ? If yes, can they speak to you on the phone? If they are too limited to speak to you on phone, refer out Yes    Are you taking any psychiatric medications? Yes    => If yes, who prescribes? If yes, are they injectable medications? depression medication    Does the patient have a language barrier or hearing impairment? No    Have you been treated at [facility] by a therapist or a doctor in the past? If yes, who? No    Has the patient been hospitalized for mental health? Yes   If yes, how long ago was last hospitalization and where was it? When she was 11  Was at [facility]     Do you actively use alcohol or marijuana or illegal substances? If yes, what and how much - refer out to Drug and alcohol treatment if use is excessive or daily use of illegal substances No concerns of substance abuse are reported  She drinks on very rare occasions     Do you have a community treatment team or ? No    Legal History-     Does the patient have any history of arrests, long term/intermediate time, or DUIs? No  If Yes-  1) What types of charges? 2) When were they last incarcerated? 3) Are they currently on parole or probation? Minor Child-    Who has custody of the child? Is there a custody agreement? If there is a custody agreement remind parent that they must bring a copy to the first appt or they will not be seen       Intake Team, please check with provider before scheduling if flags come up such as:  - complex case  - legal history (other than DUI)  - communication barrier concerns are present  - if, in your judgment, this needs further review    ACCEPTED as a patient Yes  => Appointment Date: 8/5 @ 2:30pm with Miguel Angel    Referred Elsewhere? No    Name of Insurance Co:  Insurance ID#  [employer] #  If ins is primary or secondary  If patient is a minor, parents information such as Name, D  O B of guarantor

## 2020-11-09 ENCOUNTER — TELEPHONE (OUTPATIENT)
Dept: INTERNAL MEDICINE CLINIC | Facility: CLINIC | Age: 20
End: 2020-11-09
